# Patient Record
Sex: MALE | Race: WHITE | URBAN - METROPOLITAN AREA
[De-identification: names, ages, dates, MRNs, and addresses within clinical notes are randomized per-mention and may not be internally consistent; named-entity substitution may affect disease eponyms.]

---

## 2019-06-21 ENCOUNTER — THERAPY VISIT (OUTPATIENT)
Dept: PHYSICAL THERAPY | Facility: CLINIC | Age: 29
End: 2019-06-21
Payer: COMMERCIAL

## 2019-06-21 DIAGNOSIS — M54.2 NECK PAIN: ICD-10-CM

## 2019-06-21 DIAGNOSIS — M25.512 LEFT SHOULDER PAIN: ICD-10-CM

## 2019-06-21 PROCEDURE — 97161 PT EVAL LOW COMPLEX 20 MIN: CPT | Mod: GP | Performed by: PHYSICAL THERAPIST

## 2019-06-21 PROCEDURE — 97014 ELECTRIC STIMULATION THERAPY: CPT | Mod: GP | Performed by: PHYSICAL THERAPIST

## 2019-06-21 PROCEDURE — 99207 C STAT DRY NEEDLING - IAM: CPT | Mod: 25 | Performed by: PHYSICAL THERAPIST

## 2019-06-21 NOTE — PROGRESS NOTES
[facility] Initial Evaluation  Sacha Thakkar is a 29 year old  male self referred to physical therapy for treatment of left shoulder/neck pain with Precautions/Restrictions/MD instructions eval and treat     Physical Therapy Initial Evaluation: Subjective History      Injury/Condition Details:  Presenting Complaint Left shoulder/neck pain   Onset Timing/Date Chronic condition for 5+ years, however, pain has become more severe starting earlier this week   Mechanism Initial injury occurred following a distal clavicle fracture      Symptom Behavior Details    Primary Pain Symptoms Location: Left lateral shoulder pain, left sided upper cervical pain  Quality: ache sometimes sharp   Frequency: Constant   Worst Pain 2/10   Best Pain 7/10   Symptom Provocators Reaching, lifting   Symptom Relievers Rest, inactivity   Time of day dependent? Worse during the day, however, sleeping can increase pain as well   Recent symptom change? None      Prior Testing/Intervention for current condition:  Prior Tests Per pt report, MRI 2 months ago indicating capsulitis of the AC joint   Prior Treatment PT with moderate benefit      Lifestyle & General Medical History:  General Health Reported by Patient fair   Employment Management   Usual physical activities  (within past year) Swimming   Orthopaedic history Left shoulder surgery x 4   Notable medical history Smoking     HPI                    Objective:  System                   Shoulder Evaluation:  ROM:  AROM:    Flexion:  Left:  180    Right:  180    Abduction:  Left: 180   Right:  180                Flexion/External Rotation:  Left:  T2    Right:  T2  Extension/Internal Rotation:  Left:  T8    Right:  T6          Strength:    Flexion: Left:5/5   Pain:    Right: 5/5     Pain:     Abduction:  Left: 5/5  Pain:    Right: 5/5     Pain:    Internal Rotation:  Left:5/5     Pain:    Right: 5/5     Pain:  External Rotation:   Left:4+/5     Pain:   Right:5/5     Pain:                 Palpation:    Left shoulder tenderness present at:  Infraspinatus; Teres Minor and Upper Trap  Right shoulder tenderness present at: Upper Trap  Right shoulder tenderness not present at:Infraspinatus or Teres Minor                                     General     ROS    Assessment/Plan:    Patient is a 29 year old male with cervical and left side shoulder complaints.    Patient has the following significant findings with corresponding treatment plan.                Diagnosis 1:  Left shoulder pain  Pain -  manual therapy, splint/taping/bracing/orthotics, self management, education and home program  Decreased ROM/flexibility - manual therapy, therapeutic exercise, therapeutic activity and home program  Decreased strength - therapeutic exercise, therapeutic activities and home program  Decreased proprioception - neuro re-education, therapeutic activities and home program  Impaired muscle performance - neuro re-education and home program  Diagnosis 2:  Neck pain   Pain -  electric stimulation, manual therapy, splint/taping/bracing/orthotics, self management, education and home program    Therapy Evaluation Codes:   1) History comprised of:   Personal factors that impact the plan of care:      None.    Comorbidity factors that impact the plan of care are:      Smoking.     Medications impacting care: None.  2) Examination of Body Systems comprised of:   Body structures and functions that impact the plan of care:      Cervical spine and Shoulder.   Activity limitations that impact the plan of care are:      Lifting, Sports and Reaching.  3) Clinical presentation characteristics are:   Stable/Uncomplicated.  4) Decision-Making    Low complexity using standardized patient assessment instrument and/or measureable assessment of functional outcome.  Cumulative Therapy Evaluation is: Low complexity.    Previous and current functional limitations:  (See Goal Flow Sheet for this information)    Short term and Long  term goals: (See Goal Flow Sheet for this information)     Communication ability:  Patient appears to be able to clearly communicate and understand verbal and written communication and follow directions correctly.  Treatment Explanation - The following has been discussed with the patient:   RX ordered/plan of care  Anticipated outcomes  Possible risks and side effects  This patient would benefit from PT intervention to resume normal activities.   Rehab potential is good.    Frequency:  1 X week, once daily  Duration:  4 week  Discharge Plan:  Achieve all LTG.  Independent in home treatment program.  Reach maximal therapeutic benefit.    Please refer to the daily flowsheet for treatment today, total treatment time and time spent performing 1:1 timed codes.